# Patient Record
Sex: MALE | Race: WHITE | URBAN - METROPOLITAN AREA
[De-identification: names, ages, dates, MRNs, and addresses within clinical notes are randomized per-mention and may not be internally consistent; named-entity substitution may affect disease eponyms.]

---

## 2020-11-02 ENCOUNTER — OFFICE VISIT (OUTPATIENT)
Dept: ORTHOPEDIC SURGERY | Age: 30
End: 2020-11-02
Payer: COMMERCIAL

## 2020-11-02 VITALS — BODY MASS INDEX: 21.34 KG/M2 | WEIGHT: 125 LBS | HEIGHT: 64 IN

## 2020-11-02 PROCEDURE — G8420 CALC BMI NORM PARAMETERS: HCPCS | Performed by: ORTHOPAEDIC SURGERY

## 2020-11-02 PROCEDURE — 1036F TOBACCO NON-USER: CPT | Performed by: ORTHOPAEDIC SURGERY

## 2020-11-02 PROCEDURE — G8484 FLU IMMUNIZE NO ADMIN: HCPCS | Performed by: ORTHOPAEDIC SURGERY

## 2020-11-02 PROCEDURE — 99203 OFFICE O/P NEW LOW 30 MIN: CPT | Performed by: ORTHOPAEDIC SURGERY

## 2020-11-02 PROCEDURE — G8427 DOCREV CUR MEDS BY ELIG CLIN: HCPCS | Performed by: ORTHOPAEDIC SURGERY

## 2020-11-02 NOTE — PROGRESS NOTES
NEW PATIENT VISIT      Referring Provider: same    Primary Care Provider: not listed    CHIEF COMPLAINT    Right Elbow    HISTORY OF PRESENT ILLNESS    Adonay Claudio is a 35 y.o. male who presents for new patient evaluation and consultation regarding roughly 2 to 3 weeks of posterior elbow pain. He is a avid rock climber. He reports no specific injury. Perhaps was doing more push-ups. No trauma or falls. Has had some sensitivity over the point of the olecranon. Some pain with terminal extension. No instability or effusions. No loss of movement. No neurologic symptoms. Pain Assessment  Location of Pain: Elbow  Location Modifiers: Right  Severity of Pain: 2  Quality of Pain: Dull, Aching  Frequency of Pain: Intermittent  Aggravating Factors: Straightening, Stretching  Relieving Factors: Rest  Result of Injury: No  Work-Related Injury: No  Are there other pain locations you wish to document?: No    ROS    Reviewed on November 2  All other ROS negative except for above. PAST SURGICAL HISTORY    History reviewed. No pertinent surgical history. PAST MEDICAL    History reviewed. No pertinent past medical history. ALLERGIES    No Known Allergies    MEDS    Current Outpatient Medications   Medication Sig Dispense Refill    diclofenac sodium (VOLTAREN) 1 % GEL Apply 4 g topically 4 times daily 1 Tube 0     No current facility-administered medications for this visit.         SOCIAL    Social History     Socioeconomic History    Marital status: Single     Spouse name: Not on file    Number of children: Not on file    Years of education: Not on file    Highest education level: Not on file   Occupational History    Not on file   Social Needs    Financial resource strain: Not on file    Food insecurity     Worry: Not on file     Inability: Not on file    Transportation needs     Medical: Not on file     Non-medical: Not on file   Tobacco Use    Smoking status: Never Smoker    Smokeless tobacco: Never Used   Substance and Sexual Activity    Alcohol use: Not on file    Drug use: Not on file    Sexual activity: Not on file   Lifestyle    Physical activity     Days per week: Not on file     Minutes per session: Not on file    Stress: Not on file   Relationships    Social connections     Talks on phone: Not on file     Gets together: Not on file     Attends Yazidism service: Not on file     Active member of club or organization: Not on file     Attends meetings of clubs or organizations: Not on file     Relationship status: Not on file    Intimate partner violence     Fear of current or ex partner: Not on file     Emotionally abused: Not on file     Physically abused: Not on file     Forced sexual activity: Not on file   Other Topics Concern    Not on file   Social History Narrative    Not on file       FAMILY HISTORY    History reviewed. No pertinent family history. PHYSICAL EXAM    Ht 5' 4\" (1.626 m)   Wt 125 lb (56.7 kg)   BMI 21.46 kg/m² [email or web address]   General:  Patient is alert and orientation to person place and time is age-appropriate. Gait:  Patient walks around the room and in the hallway with a normal gait and no limp. Balance and coordination are age-appropriate. Extremities: He has symmetric range of movement with regard to pronation, supination, flexion and extension. Perhaps some very subtle tenderness in terminal extension with overpressure. He has no pain today with provocative resistance to elbow extension. He has a negative posterior lateral rotatory O'Chioma's test.  He has no pain with moving valgus stress test.  He is no tenderness of the medial lateral epicondyles. No instability of the ulnar nerve  Skin:  Normal on back and bilateral upper and lower extremities. Spine:  No deformity. Neurological:  Normal motor and sensory exam in both upper and lower extremities. Vascular exam:  Normal in both upper and lower extremities.      IMAGING STUDIES    X-rays reviewed and obtained in the office today include 3 views of the right elbow. These demonstrate concentric ulnohumeral and radiocapitellar joints. There may be the suggestion of a very small early osteophyte at the tip of the olecranon       Office Procedures:      IMPRESSION    Posterior elbow pain which may be some combined presentation of triceps tendinitis with some mild posterior olecranon impingement in extension    PLAN    Can begin with some activity modification, eccentric triceps exercises. We discussed causes for concern such as loss of movement or a worsening pain which could require an MRI. We discussed the fact that this is a small posterior impingement could be a recurrent problem but if he manages it well nonsurgical treatment would be indicated. [facility]    This dictation was performed with a verbal recognition program (DRAGON) and it was checked for errors. It is possible that there are still dictated errors within this office note. If so, please bring any errors to my attention for an addendum. All efforts were made to ensure that this office note is accurate.

## 2020-11-12 ENCOUNTER — HOSPITAL ENCOUNTER (OUTPATIENT)
Dept: OCCUPATIONAL THERAPY | Age: 30
Setting detail: THERAPIES SERIES
Discharge: HOME | End: 2020-11-12
Payer: COMMERCIAL

## 2020-11-12 PROCEDURE — 97110 THERAPEUTIC EXERCISES: CPT | Performed by: OCCUPATIONAL THERAPIST

## 2020-11-12 PROCEDURE — 97535 SELF CARE MNGMENT TRAINING: CPT | Performed by: OCCUPATIONAL THERAPIST

## 2020-11-12 PROCEDURE — 97112 NEUROMUSCULAR REEDUCATION: CPT | Performed by: OCCUPATIONAL THERAPIST

## 2020-11-12 PROCEDURE — 97165 OT EVAL LOW COMPLEX 30 MIN: CPT | Performed by: OCCUPATIONAL THERAPIST

## 2020-11-12 NOTE — PLAN OF CARE
[facility]  [address], [address] [address], [address]  Phone: [phone] Fax: [phone]            Occupational Lan Fernandez  Dear Referring Practitioner: Arvind Velez MD,     We had the pleasure of evaluating the following patient for occupational therapy services at [address] [place] [address]. A summary of our findings can be found in the initial assessment below. This includes our plan of care. If you have any questions or concerns regarding these findings, please do not hesitate to contact me at the office phone number checked above. Thank you for the referral.     Physician Signature:_______________________________Date:__________________  By signing above (or electronic signature), therapists plan is approved by physician      Patient: Lesly Terry   : 1990   MRN: [id number]  Referring Physician: Referring Practitioner: Arvind Velez MD      Evaluation Date: 2020      Medical Diagnosis Information:  Diagnosis: M77.8 (ICD-10-CM) - Right elbow tendonitis    Treatment Diagnosis: M25.521 (ICD-10-CM) - Right elbow pain              Insurance information: OT Insurance Information: [facility]      Date of Injury: late Sept  Date of Surgery: NA    Date of Patient follow up with Physician: prn    RESTRICTIONS/PRECAUTIONS: -    Latex Allergy:  [x]No      []Yes  Pacemaker:  [x] No       [] Yes     Preferred Language for Healthcare:   [x]English       []other:     Functional Scale: 18% (Quick DASH)   Date assessed:  2020    C-SSRS Triggered by Intake questionnaire (Past 2 wk assessment):   X  No, Questionnaire did not trigger screening.         SUBJECTIVE: Patient reported deficits/history of current problem: progressive pain in posterior R elbow starting in ~ late Sept; reports possibly related to outdoor climbing; symptoms have improved over time; seen by MD last week and referred to therapy Pain Scale: 1-3/10  [x]Constant      []Intermittent    []other:  Pain Location:  R elbow  Easing factors: rest  Provocative factors: climbing, lifting     [x] Patient reported history, allergies, and medications reviewed - see intake form. Occupational Profile:  Home Enviroment: lives with  [] spouse,  [] family,  [x] alone,  [] significant other,   [] other:    Occupation/School:     Recreational Activities/Meaningful Interests: hiking, rock climbing (indoor/outdoor)    Prior Level of Function: [x] Independent with ADLs/IADLs     [] Assistance needed (describe):    Patient-Identified Primary Performance Deficits (to be addressed in POC):   [] bathing    [x] household tasks    [] dressing    [] self feeding   [] grooming    [] work/education   [] functional mobility   [] sleeping/rest   [] toileting/hygiene   [x] recreational activities   [] driving    [] community/social participation   [] other:     Comorbidities Affecting Functional Performance:     []Anxiety (F41.9)/Depression (F32.9)   []Diabetes Type 1(E10.65) or 2 (E11.65)   []Rheumatoid Arthritis (M05.9)  []Fibromyalgia (M79.7)  []Neuropathy(G60.9)  []Osteoarthritis(M19.91)  [x]None   []Other:    Hand Dominance:    [x]  Right    [] Left      OBJECTIVE:    Involved    AROM: Right Left   Digits: tips to DPFC   0cm   0cm   Thumb tip to DPFC 1.5cm 1.5cm   Wrist Ext/Flex            RD/UD 74/90 70/95   Forearm sup/pron   75/84 75/85   Elbow ext/flex   5HE/137 5HE/137   Shoulder Flex                   Abd                   IR/ER WNL WNL        Edema:      At elbow 24cm 23.5cm        Strength:       107   Lateral Pinch 19 21   3 Point Pinch 21 24   Tip Pinch 11 13   MMT: wrist ext                 Flex           Elbow ext               flex     5/5  5/5  5/5  5/5 5/5  5/5  5/5  5/5     Observations (including splints, bandages, incisions, scars):   No apparent edema    Sensation:  [] No reported deficits  [x] Intact to light touch    [] Adjusted   4) Pt will have a decrease in pain to 0-1/10 to facilitate return to resistive household and recreational (climbing) activities. [] Progressing: [] Met: [] Not Met: [] Adjusted        OCCUPATIONAL THERAPY EVALUATION COMPLEXITY JUSTIFICATION:    [x] An occupational profile and medical/therapy history, which includes:   [x] a brief history including medical and/or therapy records relating to the     presenting problem   [] an expanded review of medical and/or therapy records and additional review     of physical, cognitive or psychosocial history related to current functional    performance   [] an extensive additional review of review of medical and/or therapy records and physical, cognitive, or psychosocial history related to current    functional performance    [x] An assessment that identifies performance deficits (relating to physical, cognitive, or psychosocial skills) that result in activity limitations and/or participation restrictions:   [x] 1-3 performance deficits   [] 3-5 performance deficits   [] 5 or more performance deficits    [x] Clinical decision making of:   [x] low complexity, including analysis of occupational profile, data analysis from problem focused assessment, and consideration of a limited number of treatment options. No comorbidities affect occupational performance. No task modifications or assistance needed to complete evaluation. [] moderate complexity, including analysis of occupational profile, data analysis from detailed assessment and consideration of several treatment options. Comorbidities that affect occupational performance may be present. Minimal to moderate task modifications or assistance needed to complete assessment. [] high complexity, including analysis of occupational profile, analysis of data from comprehensive assessment and consideration of multiple treatment options. Multiple comorbidities present that affect occupational performance.   Significant

## 2020-11-12 NOTE — FLOWSHEET NOTE
[facility], [facility]  [address], [address] [facility], [address] [facility]  Phone: [phone] Fax: [phone]    Occupational Therapy Treatment Note/ Progress Report:     Date:  2020    Patient Name:  Elza Frost    :  1990  MRN: [id number]    Medical/Treatment Diagnosis Information:  · Diagnosis: M77.8 (ICD-10-CM) - Right elbow tendonitis   · Treatment Diagnosis: M25.521 (ICD-10-CM) - Right elbow pain     Insurance/Certification information:  OT Insurance Information: [facility]  Physician Information:  Referring Practitioner: Shannan Galindo MD  Has the plan of care been signed (Y/N):        []  Yes  [x]  No       Visit # Insurance Allowable Auth Required   1 60 []  Yes []  No        Is this a Progress Report:     []  Yes  [x]  No      If Yes:  Date Range for reporting period:  Beginning  Ending    Progress report will be due (10 Rx or 30 days whichever is less): 08/10/09     Recertification will be due (POC Duration  / 90 days whichever is less): 12/10/20     Date of Injury: late Sept  Date of Surgery: NA     Date of Patient follow up with Physician: prn     RESTRICTIONS/PRECAUTIONS: -     Latex Allergy:  [x]? No      []? Yes                    Pacemaker:  [x]? No       []? Yes      Preferred Language for Healthcare:   [x]? English       []? other:      Functional Scale: 18% (Quick DASH)                                 Date assessed:  2020     C-SSRS Triggered by Intake questionnaire (Past 2 wk assessment):   X  No, Questionnaire did not trigger screening.         SUBJECTIVE: Patient reported deficits/history of current problem: progressive pain in posterior R elbow starting in ~ late Sept; reports possibly related to outdoor climbing; symptoms have improved over time; seen by MD last week and referred to therapy      Pain Scale: 1-3/10    OBJECTIVE:       Date:  2020     Objective Measures/Tests:      ROM: See florencio Strength:            Observations: Other:                  MODALITIES:      Fluidotherapy (05785)      Estim (88018/40803)      Paraffin (17433)      US (52813)      Iontophoresis (87441)      Hot Pack      Cold Pack            INTERVENTIONS:      Therapeutic Exercise (62157)      Elbow AROM 5x       Corner stretch 5x       Biceps Stretch    Triceps Stretch    Composite Stretching (FA flexors/extensors) 3x    5x    5x each     Strengthening Emphasis on eccentric focus    10x2 elbow ext (at wall, towel roll behind upper arm) red band     Elbow flex, red tband, 10x    Tband column - B UE triceps press, shoulder ext x 10x2 each     TRX bands - slight incline pushups x 10     Therapeutic Activity (78541)                              Manual Therapy (37144) Instructed on STM, CFM                 Neuromuscular Reeducation (64966) Cueing for exercise technique, submaximal gripping, proximal stabilization, neutral wrist, eccentric vs concentric considerations                 ADL Training (15177) Instructed on diagnosis specific anatomy,  joint protection, and ADL modifications    Emphasis on \"relative rest\", painfree exercise performance                    HEP Training/Review See sheet(s)      Access Code: [email or web address]   URL: [email or web address]   Date: 11/12/2020   Prepared by:  Kimberly Zhuo     Exercises   Standing Overhead Triceps Stretch - 3-5 reps - 5-10 sec hold - 2-3x daily - 7x weekly   Standing Bicep Stretch at [address] - 3-5 reps - 5-10 sec hold - 2-3x daily - 7x weekly   Composite Wrist Flexor Stretch - Shoulder Down - 3-5 reps - 5-10sec hold - 2-3x daily - 7x weekly   Composite Wrist Extensor Stretch - Shoulder Down - 3-5 reps - 5-10sec hold - 2-3x daily - 7x weekly   Corner Stretch - 3-5 reps - 5-10 sec hold - 2-3x daily - 7x weekly   Tricep Push Up on Wall - 3-5 reps - 5-10 sec hold - 2-3x daily - 7x weekly   Standing Elbow Extension with Self-Anchored Resistance - 10 reps - 1-2 sets - 1-2x daily - 7x weekly   Elbow Flexion - Band - 10 reps - 1-2 sets - 1-2x daily - 7x weekly              Splinting      Lcode:      Orthotic Mgmt, Subsequent Enc (99751)      Orthotic Mgmt & Training (39956)            Other:                                Therapeutic Exercise & NMR:  [x] (30697) Provided verbal/tactile cueing for activities related to strengthening, flexibility, endurance, ROM  for improvements in scapular, scapulothoracic and UE control with self care, reaching, carrying, lifting, house/yardwork, driving/computer work.    [] (21785) Provided verbal/tactile cueing for activities related to improving balance, coordination, kinesthetic sense, posture, motor skill, proprioception  to assist with  scapular, scapulothoracic and UE control with self care, reaching, carrying, lifting, house/yardwork, driving/computer work.     Therapeutic Activities & NMR:    [x] (75933 or 92152) Provided verbal/tactile cueing for activities related to improving balance, coordination, kinesthetic sense, posture, motor skill, proprioception and motor activation to allow for proper function of scapular, scapulothoracic and UE control with self care, carrying, lifting, driving/computer work    Home Exercise Program:    [x] (89567) Reviewed/Progressed HEP activities related to strengthening, flexibility, endurance, ROM of scapular, scapulothoracic and UE control with self care, reaching, carrying, lifting, house/yardwork, driving/computer work  [] (16457) Reviewed/Progressed HEP activities related to improving balance, coordination, kinesthetic sense, posture, motor skill, proprioception of scapular, scapulothoracic and UE control with self care, reaching, carrying, lifting, house/yardwork, driving/computer work      Manual Treatments:  PROM / STM / Oscillations-Mobs:  G-I, II, III, IV (PA's, Inf., Post.)  [x] (04622) Provided manual therapy to mobilize soft tissue/joints of cervical/CT, scapular GHJ and UE for the purpose of modulating pain, promoting relaxation,  increasing ROM, reducing/eliminating soft tissue swelling/inflammation/restriction, improving soft tissue extensibility and allowing for proper ROM for normal function with self care, reaching, carrying, lifting, house/yardwork, driving/computer work    ADL Training:  [x] (27445) Provided self-care/home management training related to activities of daily living and compensatory training, and/or use of adaptive equipment      Charges:  Timed Code Treatment Minutes: 39   Total Treatment Minutes: 54   Worker's Comp: Time In/Time Out     [x] EVAL (LOW) 43927 (typically 20 minutes face-to-face)    [] EVAL (MOD) 64906 (typically 30 minutes face-to-face)  [] EVAL (HIGH) 43368 (typically 45 minutes face-to-face)  [] OT Re-eval (75388)       [x] Clarke [phone]) x  1    [] XGAYF(87707)  [x] NMR (73113) x 1     [] Estim (attended) (74793)   [] Manual ([phone]) x      [] US (97516)  [] TA (16938) x      [] Paraffin (48313)  [x] ADL  (89926) x  1   [] Splint/L code:    [] Estim (unattended) (22 [phone])  [] Fluidotherapy (08824)  [] Other:      ASSESSMENT:  See eval    GOALS:  Patient stated goal: eliminate pain during climbing, learn prevention or management of pain injury    [] Progressing: [] Met: [] Not Met: [] Adjusted    Therapist goals for Patient:   Short Term Goals: To be achieved in: 2 weeks  1. Independent in HEP and progression per patient tolerance, in order to prevent re-injury. [] Progressing: [] Met: [] Not Met: [] Adjusted   2. Patient will have a decrease in pain to facilitate improvement in movement, function, and ADLs as indicated by Functional Deficits. [] Progressing: [] Met: [] Not Met: [] Adjusted    Long Term Goals to be achieved in 2-4 weeks (through 12/10/20), including patient directed goals to address patient identified performance deficits:  1) Pt to be independent in graded HEP progression with a good level of effort and compliance.   [] Progressing: [] Met: [] Not Met: [] Adjusted   2) Pt to report a score of </= 15 % on the Quick DASH disability questionnaire for increased performance with carrying, moving, and handling objects. [] Progressing: [] Met: [] Not Met: [] Adjusted   3) Pt will verbalize understanding and demonstrate competency with diagnosis specific ADL modifications and joint protection techniques to enable independence with ADLs, household, and recreational activities. .  [] Progressing: [] Met: [] Not Met: [] Adjusted   4) Pt will have a decrease in pain to 0-1/10 to facilitate return to resistive household and recreational (climbing) activities. [] Progressing: [] Met: [] Not Met: [] Adjusted      Overall Progression Towards Functional Goals/Treatment Progress Update:  [] Patient is progressing as expected towards functional goals listed. [] Progression is slowed due to complexities/impairments listed. [] Progression has been slowed due to co-morbidities. [x] Plan just implemented, too soon to assess goals progression <30 days  [] Goals require adjustment due to lack of progress  [] Patient is not progressing as expected and requires additional follow up with physician  [] All goals are met  [] Other:     Prognosis for POC: [x] Good [] Fair  [] Poor    Patient requires continued skilled intervention: [x] Yes  [] No    Treatment/Activity Tolerance:  [x] Patient able to complete treatment  [] Patient limited by fatigue  [] Patient limited by pain    [] Patient limited by other medical complications  [] Other:                  PLAN: See eval  [] Continue per plan of care [] Alter current plan (see comments above)  [x] Plan of care initiated [] Hold pending MD visit [] Discharge      Electronically signed by: Kimberly Mayer OTR/L, PT, MSPT, CHT, [id number], [id number]      Note: If patient does not return for scheduled/ recommended follow up visits, this note will serve as a discharge from care along with most recent update on progress.

## 2020-11-19 ENCOUNTER — HOSPITAL ENCOUNTER (OUTPATIENT)
Dept: OCCUPATIONAL THERAPY | Age: 30
Setting detail: THERAPIES SERIES
Discharge: HOME | End: 2020-11-19
Payer: COMMERCIAL

## 2020-11-19 PROCEDURE — 97112 NEUROMUSCULAR REEDUCATION: CPT | Performed by: OCCUPATIONAL THERAPIST

## 2020-11-19 PROCEDURE — 97035 APP MDLTY 1+ULTRASOUND EA 15: CPT | Performed by: OCCUPATIONAL THERAPIST

## 2020-11-19 PROCEDURE — 97110 THERAPEUTIC EXERCISES: CPT | Performed by: OCCUPATIONAL THERAPIST

## 2020-11-19 NOTE — FLOWSHEET NOTE
[facility] [place] [address] [address],  [address], 7 [facility]  Phone: [phone] Fax: [phone]    Occupational Therapy Treatment Note/ Progress Report:     Date:  2020    Patient Name:  Jorge Gannon    :  1990  MRN: [id number]    Medical/Treatment Diagnosis Information:  · Diagnosis: M77.8 (ICD-10-CM) - Right elbow tendonitis   · Treatment Diagnosis: M25.521 (ICD-10-CM) - Right elbow pain     Insurance/Certification information:  OT Insurance Information: [facility]  Physician Information:  Referring Practitioner: Tuan Gonzalez MD  Has the plan of care been signed (Y/N):        []  Yes  [x]  No       Visit # Insurance Allowable Auth Required   2 60 []  Yes []  No        Is this a Progress Report:     []  Yes  [x]  No      If Yes:  Date Range for reporting period:  Beginning  Ending    Progress report will be due (10 Rx or 30 days whichever is less):      Recertification will be due (POC Duration  / 90 days whichever is less): 12/10/20     Date of Injury: late Sept  Date of Surgery: NA     Date of Patient follow up with Physician: prn     RESTRICTIONS/PRECAUTIONS: -     Latex Allergy:  [x]? No      []? Yes                    Pacemaker:  [x]? No       []? Yes      Preferred Language for Healthcare:   [x]? English       []? other:      Functional Scale: 18% (Quick DASH)                                 Date assessed:  2020        SUBJECTIVE: able to climb several times since last visit; compliant with HEP    Patient reported deficits/history of current problem: progressive pain in posterior R elbow starting in ~ late Sept; reports possibly related to outdoor climbing; symptoms have improved over time; seen by MD last week and referred to therapy      Pain Scale: 0-2/10    OBJECTIVE:       Date:  2020    Objective Measures/Tests:      ROM: See eval                       Strength:  II  R 120#  L 110#    MMT: shoulder scaption            Elbow ext                   flex  5/5 (after cueing for scapular setting)  5/5 5/5    Observations: Other:                  MODALITIES:      Fluidotherapy (96702)      Estim (82170/61180)      Paraffin (48281)      US (78163)  8' cont, 1MHz, 1.2wcm2, posterior elbow    Iontophoresis (30266)      Hot Pack      Cold Pack            INTERVENTIONS:      Therapeutic Exercise (28468)      Elbow AROM 5x   3x    Corner stretch 5x   5x    Biceps Stretch    Triceps Stretch    Composite Stretching (FA flexors/extensors)    Shoulder Circles - scapular setting 3x    5x    5x each 3x    3x    10x into power web (wrist ext/flex)      5x2    Strengthening Emphasis on eccentric focus    10x2 elbow ext (at wall, towel roll behind upper arm) red band     Elbow flex, red tband, 10x    Tband column - B UE triceps press, shoulder ext x 10x2 each     TRX bands - slight incline pushups x 10 10x each wrist ext, flex using 5# dumbbells B; 10x2 each green tband    Rowing, shoulder ext, triceps press - green tband x 10 each    Cable column - 30# triceps press x 10, elbow flex 25# x 10    FA bar 1.5# x 15    Therapeutic Activity (25919)                              Manual Therapy (46084) Instructed on STM, CFM                 Neuromuscular Reeducation (79748) Cueing for exercise technique, submaximal gripping, proximal stabilization, neutral wrist, eccentric vs concentric considerations Cueing for exercise technique, submaximal gripping, proximal stabilization, neutral wrist, scapular setting                  ADL Training (84476) Instructed on diagnosis specific anatomy,  joint protection, and ADL modifications    Emphasis on \"relative rest\", painfree exercise performance    Reinforced painfree progression of climbing activities                HEP Training/Review See sheet(s)      Access Code: [email or web address]   URL: [email or web address]   Date: 11/12/2020   Prepared by:  [employer]     Exercises   Standing Overhead Triceps Stretch - 3-5 reps - 5-10 sec hold - 2-3x daily - 7x weekly   Standing Bicep Stretch at Wall - 3-5 reps - 5-10 sec hold - 2-3x daily - 7x weekly   Composite Wrist Flexor Stretch - Shoulder Down - 3-5 reps - 5-10sec hold - 2-3x daily - 7x weekly   Composite Wrist Extensor Stretch - Shoulder Down - 3-5 reps - 5-10sec hold - 2-3x daily - 7x weekly   Corner Stretch - 3-5 reps - 5-10 sec hold - 2-3x daily - 7x weekly   Tricep Push Up on Wall - 3-5 reps - 5-10 sec hold - 2-3x daily - 7x weekly   Standing Elbow Extension with Self-Anchored Resistance - 10 reps - 1-2 sets - 1-2x daily - 7x weekly   Elbow Flexion - Band - 10 reps - 1-2 sets - 1-2x daily - 7x weekly    Access Code: [email or web address]   URL: [email or web address]   Date: 11/19/2020   Prepared by:  Kimberly Zhou     Exercises   Standing Overhead Triceps Stretch - 3-5 reps - 5-10 sec hold - 2-3x daily - 7x weekly   Standing Bicep Stretch at [address] - 3-5 reps - 5-10 sec hold - 2-3x daily - 7x weekly   Composite Wrist Flexor Stretch - Shoulder Down - 3-5 reps - 5-10sec hold - 2-3x daily - 7x weekly   Composite Wrist Extensor Stretch - Shoulder Down - 3-5 reps - 5-10sec hold - 2-3x daily - 7x weekly   Shoulder Circles - 3-5 reps - 2-3x daily - 7x weekly   Corner Stretch - 3-5 reps - 5-10 sec hold - 2-3x daily - 7x weekly   Tricep Push Up on Wall - 3-5 reps - 5-10 sec hold - 2-3x daily - 7x weekly   Elbow Flexion - Band - 10 reps - 1-2 sets - 1-2x daily - 7x weekly   Wrist Extension with Resistance - 10 reps - 1-2 sets - 1-2x daily - 7x weekly   Wrist Flexion with Resistance - 10 reps - 1-2 sets - 1-2x daily - 7x weekly   Resistive Forearm Rotation - 10 reps - 1-2 sets - 1-2x daily - 7x weekly   Rowing - 10 reps - 1-2 sets - 1-2x daily - 7x weekly   Shoulder Extension with Resistance - 10 reps - 1-2 sets - 1-2x daily - 7x weekly   Triceps - Elbow Extension Resistance Band - 10 reps - 1-2 sets - 1-2x daily - 7x weekly             Splinting      Lcode: Orthotic Mgmt, Subsequent Enc (66846)      Orthotic Mgmt & Training (93994)            Other:                                Therapeutic Exercise & NMR:  [x] (28414) Provided verbal/tactile cueing for activities related to strengthening, flexibility, endurance, ROM  for improvements in scapular, scapulothoracic and UE control with self care, reaching, carrying, lifting, house/yardwork, driving/computer work.    [] (16888) Provided verbal/tactile cueing for activities related to improving balance, coordination, kinesthetic sense, posture, motor skill, proprioception  to assist with  scapular, scapulothoracic and UE control with self care, reaching, carrying, lifting, house/yardwork, driving/computer work.     Therapeutic Activities & NMR:    [] (95639 or 40951) Provided verbal/tactile cueing for activities related to improving balance, coordination, kinesthetic sense, posture, motor skill, proprioception and motor activation to allow for proper function of scapular, scapulothoracic and UE control with self care, carrying, lifting, driving/computer work    Home Exercise Program:    [x] (30027) Reviewed/Progressed HEP activities related to strengthening, flexibility, endurance, ROM of scapular, scapulothoracic and UE control with self care, reaching, carrying, lifting, house/yardwork, driving/computer work  [] (50620) Reviewed/Progressed HEP activities related to improving balance, coordination, kinesthetic sense, posture, motor skill, proprioception of scapular, scapulothoracic and UE control with self care, reaching, carrying, lifting, house/yardwork, driving/computer work      Manual Treatments:  PROM / STM / Oscillations-Mobs:  G-I, II, III, IV (PA's, Inf., Post.)  [x] (20536) Provided manual therapy to mobilize soft tissue/joints of cervical/CT, scapular GHJ and UE for the purpose of modulating pain, promoting relaxation,  increasing ROM, reducing/eliminating soft tissue swelling/inflammation/restriction, improving soft tissue extensibility and allowing for proper ROM for normal function with self care, reaching, carrying, lifting, house/yardwork, driving/computer work    ADL Training:  [x] (63465) Provided self-care/home management training related to activities of daily living and compensatory training, and/or use of adaptive equipment      Charges:  Timed Code Treatment Minutes: 60   Total Treatment Minutes: 60   Worker's Comp: Time In/Time Out     [] EVAL (LOW) 44076 (typically 20 minutes face-to-face)    [] EVAL (MOD) 70661 (typically 30 minutes face-to-face)  [] EVAL (HIGH) 18569 (typically 45 minutes face-to-face)  [] OT Re-eval (05467)       [x] Clarke [phone]) x  2   [] YCWVG(91764)  [x] NMR (52399) x 1     [] Estim (attended) (21339)   [] Manual ([phone]) x      [x] US (27724)  [] TA (47372) x      [] Paraffin (29735)  [] ADL  (85492) x     [] Splint/L code:    [] Estim (unattended) [phone])  [] Fluidotherapy (15563)  [] Other:      ASSESSMENT:  Improved strength and functional performance, decreased pain; continue to reinforce exercise technique with emphasis on stretching and eccentric strengthening     GOALS:  Patient stated goal: eliminate pain during climbing, learn prevention or management of pain injury    [x] Progressing: [] Met: [] Not Met: [] Adjusted    Therapist goals for Patient:   Short Term Goals: To be achieved in: 2 weeks  1. Independent in HEP and progression per patient tolerance, in order to prevent re-injury. [x] Progressing: [] Met: [] Not Met: [] Adjusted   2. Patient will have a decrease in pain to facilitate improvement in movement, function, and ADLs as indicated by Functional Deficits. [x] Progressing: [] Met: [] Not Met: [] Adjusted    Long Term Goals to be achieved in 2-4 weeks (through 12/10/20), including patient directed goals to address patient identified performance deficits:  1) Pt to be independent in graded HEP progression with a good level of effort and compliance.   [x] Progressing: [] Met: [] Not Met: [] Adjusted   2) Pt to report a score of </= 15 % on the Quick DASH disability questionnaire for increased performance with carrying, moving, and handling objects. [x] Progressing: [] Met: [] Not Met: [] Adjusted   3) Pt will verbalize understanding and demonstrate competency with diagnosis specific ADL modifications and joint protection techniques to enable independence with ADLs, household, and recreational activities. .  [x] Progressing: [] Met: [] Not Met: [] Adjusted   4) Pt will have a decrease in pain to 0-1/10 to facilitate return to resistive household and recreational (climbing) activities. [x] Progressing: [] Met: [] Not Met: [] Adjusted      Overall Progression Towards Functional Goals/Treatment Progress Update:  [x] Patient is progressing as expected towards functional goals listed. [] Progression is slowed due to complexities/impairments listed. [] Progression has been slowed due to co-morbidities. [] Plan just implemented, too soon to assess goals progression <30 days  [] Goals require adjustment due to lack of progress  [] Patient is not progressing as expected and requires additional follow up with physician  [] All goals are met  [] Other:     Prognosis for POC: [x] Good [] Fair  [] Poor    Patient requires continued skilled intervention: [x] Yes  [] No    Treatment/Activity Tolerance:  [x] Patient able to complete treatment  [] Patient limited by fatigue  [] Patient limited by pain    [] Patient limited by other medical complications  [] Other:                  PLAN:   [x] Continue per plan of care [] Alter current plan (see comments above)  [] Plan of care initiated [] Hold pending MD visit [] Discharge      Electronically signed by: Kimberly Vidal OTR/L, PT, MSPT, CHT, [id number], [id number]      Note: If patient does not return for scheduled/ recommended follow up visits, this note will serve as a discharge from care along with most recent update on progress.